# Patient Record
Sex: FEMALE | Race: BLACK OR AFRICAN AMERICAN
[De-identification: names, ages, dates, MRNs, and addresses within clinical notes are randomized per-mention and may not be internally consistent; named-entity substitution may affect disease eponyms.]

---

## 2017-06-12 ENCOUNTER — HOSPITAL ENCOUNTER (OUTPATIENT)
Dept: HOSPITAL 62 - END | Age: 22
Discharge: HOME | End: 2017-06-12
Attending: INTERNAL MEDICINE
Payer: OTHER GOVERNMENT

## 2017-06-12 VITALS — SYSTOLIC BLOOD PRESSURE: 119 MMHG | DIASTOLIC BLOOD PRESSURE: 70 MMHG

## 2017-06-12 DIAGNOSIS — K62.5: ICD-10-CM

## 2017-06-12 DIAGNOSIS — K52.9: Primary | ICD-10-CM

## 2017-06-12 PROCEDURE — 45380 COLONOSCOPY AND BIOPSY: CPT

## 2017-06-12 PROCEDURE — 88305 TISSUE EXAM BY PATHOLOGIST: CPT

## 2017-06-12 PROCEDURE — 0DBB8ZX EXCISION OF ILEUM, VIA NATURAL OR ARTIFICIAL OPENING ENDOSCOPIC, DIAGNOSTIC: ICD-10-PCS | Performed by: INTERNAL MEDICINE

## 2017-06-12 NOTE — OPERATIVE REPORT
Operative Report


DATE OF SURGERY: 06/12/17


Operative Report: 


The risks, benefits and alternatives of the procedure including risks of 

bleeding, perforation requiring surgery are explained to the patient detail and 

informed consent was obtained.  Patient was taken back to the endoscopy suite.  

She is placed in the left, lateral decubital position.  Was called.  Conscious 

sedation medications are provided.  An Olympus endoscope was inserted into the 

patient's rectum.  The scope was then carefully advanced all the way to the 

cecum.  The cecum was identified by the usual anatomical landmarks including 

the ileocecal valve as well as the appendiceal office.  Intubation of the 

terminal ileum was done.  Prep is good.  Scope was then sequentially pulled 

back.  The rest segments of the colon including the ascending colon, hepatic 

flexure, transverse colon, splenic flexure, descending colon and finally to the 

rectosigmoid portions of the colon.  Retro-flexion maneuvers performed.


PREOPERATIVE DIAGNOSIS: Rectal bleeding


POSTOPERATIVE DIAGNOSIS: Mild terminal ileitis status post biopsy


OPERATION: Colonoscopy with biopsy


SURGEON: NATALIA PRADO


ANESTHESIA: Moderate Sedation - 25 mg of Benadryl, 2 mg of Versed, 75 mcg of 

fentanyl.  Conscious sedation monitoring time 30 minutes.


TISSUE REMOVED OR ALTERED: Terminal ileum specimens obtained to rule out Crohn'

s disease


COMPLICATIONS: 


None.


ESTIMATED BLOOD LOSS: None.


INTRAOPERATIVE FINDINGS: No masses, AVMs, diverticulosis noted.


PROCEDURE: 


Patient tolerated the procedure well.


No immediate postprocedure complications are noted.


Patient discharged in good condition.


Discharge date 6/12/2017.


Discharge diet: Regular.


Discharge activity: Regular.


2-3 week follow-up to discuss findings.


Patient is instructed to call the office or proceed to the emergency room 

should there be any further problems or questions.


We will wait on pathology.

## 2018-02-22 ENCOUNTER — HOSPITAL ENCOUNTER (EMERGENCY)
Dept: HOSPITAL 62 - ER | Age: 23
LOS: 1 days | Discharge: HOME | End: 2018-02-23
Payer: OTHER GOVERNMENT
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
## 2018-02-22 DIAGNOSIS — Z88.6: ICD-10-CM

## 2018-02-22 DIAGNOSIS — R45.851: Primary | ICD-10-CM

## 2018-02-22 LAB
ADD MANUAL DIFF: NO
ALBUMIN SERPL-MCNC: 4.8 G/DL (ref 3.5–5)
ALP SERPL-CCNC: 65 U/L (ref 38–126)
ALT SERPL-CCNC: 31 U/L (ref 9–52)
ANION GAP SERPL CALC-SCNC: 14 MMOL/L (ref 5–19)
APAP SERPL-MCNC: < 10 UG/ML (ref 10–30)
APPEARANCE UR: (no result)
APTT PPP: YELLOW S
AST SERPL-CCNC: 23 U/L (ref 14–36)
BARBITURATES UR QL SCN: NEGATIVE
BASOPHILS # BLD AUTO: 0.1 10^3/UL (ref 0–0.2)
BASOPHILS NFR BLD AUTO: 0.6 % (ref 0–2)
BILIRUB DIRECT SERPL-MCNC: 0.1 MG/DL (ref 0–0.4)
BILIRUB SERPL-MCNC: 1 MG/DL (ref 0.2–1.3)
BILIRUB UR QL STRIP: NEGATIVE
BUN SERPL-MCNC: 11 MG/DL (ref 7–20)
CALCIUM: 10.1 MG/DL (ref 8.4–10.2)
CHLORIDE SERPL-SCNC: 106 MMOL/L (ref 98–107)
CO2 SERPL-SCNC: 21 MMOL/L (ref 22–30)
EOSINOPHIL # BLD AUTO: 0 10^3/UL (ref 0–0.6)
EOSINOPHIL NFR BLD AUTO: 0.2 % (ref 0–6)
ERYTHROCYTE [DISTWIDTH] IN BLOOD BY AUTOMATED COUNT: 14.6 % (ref 11.5–14)
ETHANOL SERPL-MCNC: < 10 MG/DL
GLUCOSE SERPL-MCNC: 96 MG/DL (ref 75–110)
GLUCOSE UR STRIP-MCNC: NEGATIVE MG/DL
HCT VFR BLD CALC: 40.6 % (ref 36–47)
HGB BLD-MCNC: 13.7 G/DL (ref 12–15.5)
KETONES UR STRIP-MCNC: 80 MG/DL
LYMPHOCYTES # BLD AUTO: 2.7 10^3/UL (ref 0.5–4.7)
LYMPHOCYTES NFR BLD AUTO: 26.7 % (ref 13–45)
MCH RBC QN AUTO: 28 PG (ref 27–33.4)
MCHC RBC AUTO-ENTMCNC: 33.7 G/DL (ref 32–36)
MCV RBC AUTO: 83 FL (ref 80–97)
METHADONE UR QL SCN: NEGATIVE
MONOCYTES # BLD AUTO: 0.6 10^3/UL (ref 0.1–1.4)
MONOCYTES NFR BLD AUTO: 6.2 % (ref 3–13)
NEUTROPHILS # BLD AUTO: 6.8 10^3/UL (ref 1.7–8.2)
NEUTS SEG NFR BLD AUTO: 66.3 % (ref 42–78)
NITRITE UR QL STRIP: NEGATIVE
PCP UR QL SCN: NEGATIVE
PH UR STRIP: 5 [PH] (ref 5–9)
PLATELET # BLD: 328 10^3/UL (ref 150–450)
POTASSIUM SERPL-SCNC: 3.8 MMOL/L (ref 3.6–5)
PROT SERPL-MCNC: 7.6 G/DL (ref 6.3–8.2)
PROT UR STRIP-MCNC: 30 MG/DL
RBC # BLD AUTO: 4.88 10^6/UL (ref 3.72–5.28)
SALICYLATES SERPL-MCNC: < 1 MG/DL (ref 2–20)
SODIUM SERPL-SCNC: 141.1 MMOL/L (ref 137–145)
SP GR UR STRIP: 1.03
TOTAL CELLS COUNTED % (AUTO): 100 %
URINE AMPHETAMINES SCREEN: NEGATIVE
URINE BENZODIAZEPINES SCREEN: NEGATIVE
URINE COCAINE SCREEN: NEGATIVE
URINE MARIJUANA (THC) SCREEN: (no result)
UROBILINOGEN UR-MCNC: 2 MG/DL (ref ?–2)
WBC # BLD AUTO: 10.2 10^3/UL (ref 4–10.5)

## 2018-02-22 PROCEDURE — 36415 COLL VENOUS BLD VENIPUNCTURE: CPT

## 2018-02-22 PROCEDURE — 80307 DRUG TEST PRSMV CHEM ANLYZR: CPT

## 2018-02-22 PROCEDURE — 99285 EMERGENCY DEPT VISIT HI MDM: CPT

## 2018-02-22 PROCEDURE — 93010 ELECTROCARDIOGRAM REPORT: CPT

## 2018-02-22 PROCEDURE — 84703 CHORIONIC GONADOTROPIN ASSAY: CPT

## 2018-02-22 PROCEDURE — 81001 URINALYSIS AUTO W/SCOPE: CPT

## 2018-02-22 PROCEDURE — 80053 COMPREHEN METABOLIC PANEL: CPT

## 2018-02-22 PROCEDURE — 93005 ELECTROCARDIOGRAM TRACING: CPT

## 2018-02-22 PROCEDURE — 85025 COMPLETE CBC W/AUTO DIFF WBC: CPT

## 2018-02-22 NOTE — ER DOCUMENT REPORT
ED Psych Disorder / Suicide





- General


Chief Complaint: Suicidal Ideation


Stated Complaint: POSSIBLE OVERDOSE


Time Seen by Provider: 02/22/18 21:11


Notes: 


The patient is a 22-year-old female, no prior mental health diagnoses, presents 

after she took 14 Advil cold tabs at 2000 tonight and attempt to kill herself.  

She said that she does not think she swallowed any because she immediately 

vomited them up and called EMS.  EMS gave her charcoal 30 minutes after the 

ingestion.  Patient says that she has had frequent thoughts of killing herself 

and she was age 13, but has never seen a mental health professional.  Patient 

denies any pain, nausea, vomiting, hallucinations, increased stressors, alcohol 

use, chest pain or shortness of breath.


TRAVEL OUTSIDE OF THE [place] IN LAST 30 DAYS: No





- Related Data


Allergies/Adverse Reactions: 


 





acetaminophen [From Tylenol] Allergy (Intermediate, Verified 06/12/17 12:03)


 Vomiting


oxycodone Allergy (Unknown, Verified 06/12/17 12:03)


 Vomiting


jake-seltzer liquid Allergy (Intermediate, Uncoded 06/12/17 12:03)


 Nausea


narcotics Allergy (Intermediate, Uncoded 06/12/17 12:03)


 VOMITING











Past Medical History





- General


Information source: Patient





- Social History


Smoking Status: Unknown if Ever Smoked


Family History: Reviewed & Not Pertinent





- Past Medical History


Cardiac Medical History: 


   Denies: Hx Coronary Artery Disease, Hx Heart Attack, Hx Hypertension


Pulmonary Medical History: 


   Denies: Hx Asthma, Hx Bronchitis, Hx COPD, Hx Pneumonia


Neurological Medical History: Denies: Hx Cerebrovascular Accident, Hx Seizures 

- states "been seeing flashing lights, started when stomach pain started."


Musculoskeltal Medical History: Denies Hx Arthritis


Past Surgical History: Denies: Hx Hysterectomy





- Immunizations


Hx Diphtheria, Pertussis, Tetanus Vaccination: Yes





Review of Systems





- Review of Systems


Notes: 


REVIEW OF SYSTEMS:


CONSTITUTIONAL: -fevers, -chills


EENT: -eye pain, -difficulty swallowing, -nasal congestion


CARDIOVASCULAR: -chest pain, -syncope.


RESPIRATORY: -cough, -SOB


GASTROINTESTINAL: -abdominal pain, -nausea, -vomiting, -diarrhea


GENITOURINARY: -dysuria, -hematuria


MUSCULOSKELETAL: -back pain, -neck pain


SKIN: -rash or skin lesions.


HEMATOLOGIC: -easy bruising or bleeding.


LYMPHATIC: -swollen, enlarged glands.


NEUROLOGICAL: -altered mental status or loss of consciousness, -headache, -

neurologic symptoms


PSYCHIATRIC: -anxiety, +depression, +SI


ALL OTHER SYSTEMS REVIEWED AND NEGATIVE.





Physical Exam





- Vital signs


Vitals: 


 











Temp Resp BP Pulse Ox


 


 99.3 F   18   133/89 H  100 


 


 02/22/18 21:04  02/22/18 21:04  02/22/18 21:04  02/22/18 21:04














- Notes


Notes: 


PHYSICAL EXAMINATION:


GENERAL: Well-appearing, well-nourished and in no acute distress.


HEAD: Atraumatic, normocephalic.


EYES: Pupils equal round and reactive to light, extraocular movements intact, 

sclera anicteric, conjunctiva are normal.


ENT: nares patent, oropharynx clear without exudates.  Moist mucous membranes.


NECK: Normal range of motion, supple without lymphadenopathy


LUNGS: Breath sounds clear to auscultation bilaterally and equal.  No wheezes 

rales or rhonchi.


HEART: Regular rate and rhythm without murmurs


ABDOMEN: Soft, nontender, normoactive bowel sounds.  No guarding, no rebound.  

No masses appreciated.


EXTREMITIES: Normal range of motion, no pitting or edema.  No cyanosis.


NEUROLOGICAL: Cranial nerves grossly intact.  Normal speech, normal gait.  

Normal sensory and motor exams.


PSYCH: Bizarre affect. Suicidal thoughts.


SKIN: Warm, Dry, normal turgor, no rashes or lesions noted.





Course





- Re-evaluation


Re-evalutation: 


Patient appears very well.  Do not suspect that she actually ingested any of 

the medications inside her.  Advil and a small amount of NyQuil would not cause 

any dangerous toxicologic emergencies.  We will continue to monitor.  Because 

this was an intentional suicide attempt, an IVC was filled out.  Will have 

mental health evaluate patient in the morning.





- Vital Signs


Vital signs: 


 











Temp Pulse Resp BP Pulse Ox


 


 99.3 F      22 H  133/89 H  100 


 


 02/22/18 21:04     02/22/18 21:05  02/22/18 21:04  02/22/18 21:05














- Laboratory


Result Diagrams: 


 02/22/18 20:48





 02/22/18 20:48


Laboratory results interpreted by me: 


 











  02/22/18





  20:48


 


RDW  14.6 H














- EKG Interpretation by Me


EKG shows normal: Sinus rhythm, Axis, Intervals, QRS Complexes, ST-T Waves





Discharge





- Discharge


Clinical Impression: 


Suicide attempt by inadequate means


Qualifiers:


 Encounter type: initial encounter Qualified Code(s): X83.8XXA - Intentional 

self-harm by other specified means, initial encounter





Condition: Stable

## 2018-02-23 VITALS — DIASTOLIC BLOOD PRESSURE: 91 MMHG | SYSTOLIC BLOOD PRESSURE: 139 MMHG

## 2018-02-23 NOTE — EKG REPORT
SEVERITY:- ABNORMAL ECG -

SINUS RHYTHM

ABNORMAL T, CONSIDER ISCHEMIA, ANTERIOR LEADS

:

Confirmed by: Gaudencio Saab MD 23-Feb-2018 08:00:37
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
## 2018-02-23 NOTE — ER DOCUMENT REPORT
Doctor's Note


Notes: 





02/23/18 09:24


As the rounding physician for our psychiatric patients, I have reviewed the 

chart, vitals, lab work. Patient has been examined and noted to be stable, she 

is not no longer agitated. I am awaiting mental health in put.


02/23/18 14:29